# Patient Record
Sex: FEMALE | URBAN - METROPOLITAN AREA
[De-identification: names, ages, dates, MRNs, and addresses within clinical notes are randomized per-mention and may not be internally consistent; named-entity substitution may affect disease eponyms.]

---

## 2023-01-05 ENCOUNTER — APPOINTMENT (RX ONLY)
Dept: URBAN - METROPOLITAN AREA CLINIC 158 | Facility: CLINIC | Age: 23
Setting detail: DERMATOLOGY
End: 2023-01-05

## 2023-01-05 DIAGNOSIS — L85.3 XEROSIS CUTIS: ICD-10-CM

## 2023-01-05 PROCEDURE — ? COUNSELING

## 2023-01-05 PROCEDURE — 99202 OFFICE O/P NEW SF 15 MIN: CPT

## 2023-01-05 NOTE — PROCEDURE: COUNSELING
Detail Level: Detailed
Patient Specific Counseling (Will Not Stick From Patient To Patient): patient moved from [place] to [place] in 5/2022; noted dry skin on her hands for at least 1 week, remedied by using mild hand soap and hand cream.\\nPresents today to make sure she does not have a particular skin disorder beyond dry skin.\\nOn exam, no hand dermatitis; mild dryness of the skin over the dorsal hands, not uncommon in low humidity environments in the wintertime.\\nReassured, no skin disease presently.\\nRecommend, continuing the mild soap and applying hand cream after every hand wash. If dryness worsens as the winter progresses, do nightly few minute long warm water soak followed by application of Vaseline liberally to the hands and then wear a cotton mitt overtop to bed (cotton footies are fine).\\n\\nIf in the future, if a hand dermatitis occurs, please return for evaluation